# Patient Record
Sex: MALE | Race: OTHER | Employment: FULL TIME | URBAN - METROPOLITAN AREA
[De-identification: names, ages, dates, MRNs, and addresses within clinical notes are randomized per-mention and may not be internally consistent; named-entity substitution may affect disease eponyms.]

---

## 2019-11-01 ENCOUNTER — OFFICE VISIT (OUTPATIENT)
Dept: SURGERY | Facility: CLINIC | Age: 44
End: 2019-11-01
Payer: COMMERCIAL

## 2019-11-01 VITALS
TEMPERATURE: 98 F | WEIGHT: 228 LBS | BODY MASS INDEX: 32.64 KG/M2 | DIASTOLIC BLOOD PRESSURE: 82 MMHG | SYSTOLIC BLOOD PRESSURE: 132 MMHG | HEART RATE: 64 BPM | HEIGHT: 70 IN

## 2019-11-01 DIAGNOSIS — Z87.891 FORMER SMOKER: ICD-10-CM

## 2019-11-01 DIAGNOSIS — R35.1 NOCTURIA: ICD-10-CM

## 2019-11-01 DIAGNOSIS — Z30.09 VASECTOMY EVALUATION: Primary | ICD-10-CM

## 2019-11-01 PROCEDURE — 99204 OFFICE O/P NEW MOD 45 MIN: CPT | Performed by: UROLOGY

## 2019-11-01 RX ORDER — TRAMADOL HYDROCHLORIDE 50 MG/1
TABLET ORAL
Qty: 10 TABLET | Refills: 0 | Status: SHIPPED | OUTPATIENT
Start: 2019-11-01 | End: 2020-06-29

## 2019-11-01 RX ORDER — DIAZEPAM 5 MG/1
TABLET ORAL
Qty: 3 TABLET | Refills: 0 | Status: SHIPPED | OUTPATIENT
Start: 2019-11-01 | End: 2020-06-29

## 2019-11-01 RX ORDER — CEFADROXIL 500 MG/1
CAPSULE ORAL
Qty: 10 CAPSULE | Refills: 0 | Status: SHIPPED | OUTPATIENT
Start: 2019-11-01

## 2019-11-01 NOTE — PROGRESS NOTES
Lupe Lemus is a 45year old male. HPI:   Patient presents with:  Vasectomy: PT presents for consult for vasectomy. PT denies current or past urological surgeries or trauma. PT states he has two children ages 10 and 1.        History provided by yon patient smoked for 10 years, 2-3 cigarettes daily; he quit in approximately 2017. He denies fume exposure. Family History--The patient denies known cancer in his family. He denies family history of kidney stone.       HISTORY:  No past medical history on and psychiatric symptoms  Respiratory:  Negative for cough, dyspnea and wheezing      PHYSICAL EXAM:   Constitutional: appears well hydrated alert and responsive no acute distress noted  Neurological: Oriented to time, place, person with normal affect  Exa increased risk of prostate cancer about 22 years after vasectomy.   I explained to him and he understands that although most other studies done on this topic show that there is no increased risk, the fact that the above mentioned research group argued in th viewed as irreversible and that it is possible to sew the ends of the vas deferens back together again using microscopy, but that a significant number of these men cannot make their wives pregnant after such a reversal.  I explained to him and he [place] smoker    Diagnoses and discussion---           1)  Voluntary sterilization --- By the end of the evaluation, patient decides that he definitely wants to undergo voluntary sterilization.   I explained to patient and I answered his  questions on the benefits cancer. RECOMMENDATIONS AND TREATMENT PLAN      1. Please continue  birth control precautions without exception, every time you have intercourse,  until further notice    2.   Proceed with plans for voluntary sterilization -- bilateral vasectomy arrival at our urology  on the second floor at the [facility].   On the other hand,  if you choose to have the vasectomy at   the surgery center , first floor [facility],    please take the diazepam and hydrocodone 30 minutes before Fidelia Pham MD, 11/1/2019, 4:41 PM

## 2019-11-01 NOTE — PATIENT INSTRUCTIONS
Joey Benítez M.D.    1.  Please continue  birth control precautions without exception, every time you have intercourse,  until further notice    2.   Proceed with plans for voluntary sterilization -- bilateral hydrocodone upon arrival at our urology  on the second floor at the [facility].   On the other hand,  if you choose to have the vasectomy at   the surgery center , first floor [facility],    please take the diazepam and hydrocodone 3

## 2019-11-06 ENCOUNTER — APPOINTMENT (OUTPATIENT)
Dept: LAB | Facility: HOSPITAL | Age: 44
End: 2019-11-06
Attending: UROLOGY
Payer: COMMERCIAL

## 2019-11-06 DIAGNOSIS — R35.1 NOCTURIA: ICD-10-CM

## 2019-11-06 PROCEDURE — 81001 URINALYSIS AUTO W/SCOPE: CPT

## 2019-11-13 ENCOUNTER — TELEPHONE (OUTPATIENT)
Dept: SURGERY | Facility: CLINIC | Age: 44
End: 2019-11-13

## 2019-11-13 NOTE — TELEPHONE ENCOUNTER
Verified PT's name and . Spoke with patient regarding results, PT verbalized understanding. PT states he will call back to schedule vasectomy.

## 2019-11-13 NOTE — TELEPHONE ENCOUNTER
----- Message from MIKE Graham sent at 11/6/2019 11:49 AM CST -----  Staff,    Please let patient know his urinalysis is normal.  It does not look indicative of infection and it does not show a significant amount of red blood cells.   He may p

## 2020-01-14 ENCOUNTER — TELEPHONE (OUTPATIENT)
Dept: SURGERY | Facility: CLINIC | Age: 45
End: 2020-01-14

## 2020-01-14 NOTE — TELEPHONE ENCOUNTER
Patient contacted. Patient scheduled Vasectomy in office for Fri 4/17/2020 @ 0800; to arrive @ 7:30 am. All Pre-Vasectomy instructions verbally given to patient as well as written instructions mailed to patient.  Patient verbalized understanding and states

## 2020-04-03 ENCOUNTER — TELEPHONE (OUTPATIENT)
Dept: SURGERY | Facility: CLINIC | Age: 45
End: 2020-04-03

## 2020-04-07 NOTE — TELEPHONE ENCOUNTER
Do to COVID-19 (Coronavirus) global pandemic, shelter in place order, no elective surgeries, I called patient and rescheduled his Vasectomy to July 24. Patient agreed, all questions answered.

## 2020-06-29 ENCOUNTER — TELEPHONE (OUTPATIENT)
Dept: SURGERY | Facility: CLINIC | Age: 45
End: 2020-06-29

## 2020-06-29 RX ORDER — DIAZEPAM 5 MG/1
TABLET ORAL
Qty: 3 TABLET | Refills: 0 | OUTPATIENT
Start: 2020-06-29

## 2020-06-29 RX ORDER — TRAMADOL HYDROCHLORIDE 50 MG/1
TABLET ORAL
Qty: 10 TABLET | Refills: 0 | OUTPATIENT
Start: 2020-06-29

## 2020-06-29 NOTE — TELEPHONE ENCOUNTER
Reordered both Tramadol and Diazepam and pended them to [facility] to sign but the Tramadol needs prior auth. I sent a PA request to the clinical pool. Tasked to [address] to sign scripts so they can be called in to pt's Judah Ivan.

## 2020-06-29 NOTE — TELEPHONE ENCOUNTER
I signed both orders as a \"phone in\"; epic will not let me send the tramadol electronically.   Thanks, Dr. Scar Steele

## 2020-07-02 NOTE — TELEPHONE ENCOUNTER
I called in both the diazepam 5 mg and tramadol 50 mg script to the pharmacist Bello at the [facility] for the pt and I also called pt to inform him of this.

## 2020-07-09 NOTE — TELEPHONE ENCOUNTER
Spoke to pharmacy, [facility]. Pharmacist states patient picked up tramadol 7/2, paid $1.00. No prior auth needed. No further action required.

## 2020-07-18 ENCOUNTER — TELEPHONE (OUTPATIENT)
Dept: SURGERY | Facility: CLINIC | Age: 45
End: 2020-07-18

## 2020-07-18 NOTE — TELEPHONE ENCOUNTER
Per pt is wanting to know if he will be needing Covid-19 testing before procedure on 7/24/20.  Please advise

## 2020-07-20 NOTE — TELEPHONE ENCOUNTER
Spoke with patient advised that he does not need Covid testing for office procedure on 7/24/20 patient verbalized understanding.

## 2020-07-24 ENCOUNTER — PROCEDURE (OUTPATIENT)
Dept: SURGERY | Facility: CLINIC | Age: 45
End: 2020-07-24
Payer: COMMERCIAL

## 2020-07-24 VITALS
BODY MASS INDEX: 32.64 KG/M2 | RESPIRATION RATE: 16 BRPM | HEART RATE: 94 BPM | DIASTOLIC BLOOD PRESSURE: 90 MMHG | SYSTOLIC BLOOD PRESSURE: 130 MMHG | TEMPERATURE: 98 F | WEIGHT: 228 LBS | HEIGHT: 70 IN

## 2020-07-24 DIAGNOSIS — Z30.2 ENCOUNTER FOR VASECTOMY: Primary | ICD-10-CM

## 2020-07-24 DIAGNOSIS — Z30.8 POSTVASECTOMY SPERM COUNT: ICD-10-CM

## 2020-07-24 DIAGNOSIS — Z30.2 ENCOUNTER FOR STERILIZATION: ICD-10-CM

## 2020-07-24 PROCEDURE — 3075F SYST BP GE 130 - 139MM HG: CPT | Performed by: UROLOGY

## 2020-07-24 PROCEDURE — 3008F BODY MASS INDEX DOCD: CPT | Performed by: UROLOGY

## 2020-07-24 PROCEDURE — 3080F DIAST BP >= 90 MM HG: CPT | Performed by: UROLOGY

## 2020-07-24 PROCEDURE — 55250 REMOVAL OF SPERM DUCT(S): CPT | Performed by: UROLOGY

## 2020-07-24 NOTE — PROGRESS NOTES
UROLOGY PROCEDURE NOTE      PREOPERATIVE DIAGNOSIS:          Desires voluntary sterilization - bilateral vasectomy. POSTOPERATIVE DIAGNOSIS:         Same. PROCEDURE PERFORMED: Voluntary sterilization - bilateral vasectomy.       ANESTHESIA:   Randall procedure was performed on the contralateral side through the same  skin opening.   At the end of the case, the wound was closed using a suture of 3-0 chromic followed by antibiotic ointment, a small Telfa dressing, then a small 2 x 2 sterile dressing, then

## 2020-07-24 NOTE — PATIENT INSTRUCTIONS
Dayron Gonzalez M.D.       1.   Please continue birth control precautions until further notice. 2.   Apply cold pack to operative area intermittently today, 10-15 minutes on, 5 minutes off.     3.   Please fi

## 2020-08-29 ENCOUNTER — LAB ENCOUNTER (OUTPATIENT)
Dept: LAB | Facility: HOSPITAL | Age: 45
End: 2020-08-29
Attending: UROLOGY
Payer: COMMERCIAL

## 2020-08-29 DIAGNOSIS — Z30.8 POSTVASECTOMY SPERM COUNT: ICD-10-CM

## 2020-08-29 PROCEDURE — 89321 SEMEN ANAL SPERM DETECTION: CPT

## 2020-09-04 ENCOUNTER — OFFICE VISIT (OUTPATIENT)
Dept: SURGERY | Facility: CLINIC | Age: 45
End: 2020-09-04
Payer: COMMERCIAL

## 2020-09-04 VITALS — SYSTOLIC BLOOD PRESSURE: 148 MMHG | HEART RATE: 99 BPM | DIASTOLIC BLOOD PRESSURE: 79 MMHG

## 2020-09-04 DIAGNOSIS — Z98.52 STATUS POST VASECTOMY: ICD-10-CM

## 2020-09-04 DIAGNOSIS — Z30.8 POSTVASECTOMY SPERM COUNT: Primary | ICD-10-CM

## 2020-09-04 PROCEDURE — 3077F SYST BP >= 140 MM HG: CPT | Performed by: UROLOGY

## 2020-09-04 PROCEDURE — 3078F DIAST BP <80 MM HG: CPT | Performed by: UROLOGY

## 2020-09-04 NOTE — PROGRESS NOTES
The patient comes in for his first visit after his office bilateral vasectomy several weeks ago. He has no complaints. He denies any significant pain. He denies any scrotal swelling or wound infections.   Patient states that if he feels mild discomfort, give the patient the option of submitting a semen analysis to the lab in 10-11 months to screen for the possibility of the ends of the vas deferens growing back together again and the patient becoming  fertile again.   I answered all of the patient's Jesús Oliveira

## 2020-09-04 NOTE — PATIENT INSTRUCTIONS
Dulce Gutierres M.D.      1.   Continue birth control precautions without exception    2.   Please submit another postvasectomy semen analysis in 6 weeks; after submitting specimen, please leave message with office the fo

## 2020-10-06 ENCOUNTER — LAB ENCOUNTER (OUTPATIENT)
Dept: LAB | Facility: HOSPITAL | Age: 45
End: 2020-10-06
Attending: UROLOGY
Payer: COMMERCIAL

## 2020-10-06 DIAGNOSIS — Z30.8 POSTVASECTOMY SPERM COUNT: ICD-10-CM

## 2020-10-06 PROCEDURE — 89321 SEMEN ANAL SPERM DETECTION: CPT
